# Patient Record
Sex: MALE | Race: WHITE | NOT HISPANIC OR LATINO | URBAN - METROPOLITAN AREA
[De-identification: names, ages, dates, MRNs, and addresses within clinical notes are randomized per-mention and may not be internally consistent; named-entity substitution may affect disease eponyms.]

---

## 2024-08-16 ENCOUNTER — TELEPHONE (OUTPATIENT)
Dept: PHYSICAL MEDICINE AND REHAB | Facility: CLINIC | Age: 64
End: 2024-08-16
Payer: OTHER GOVERNMENT

## 2024-08-16 NOTE — TELEPHONE ENCOUNTER
Message received that patient wanted to schedule with pain management and had referral. Called patient and he states he was referred by word of mouth (Dr. Hernandez) to see Dr. Guaman. At first, patient could not tell me an exact reason for PM eval. 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 He also endorsed that he has a hx of being on opioid pain meds, but not currently. He says that PCP put him on a medication, he did not specify, and he could not function while taking it. I advised patient that we are Interventional Pain Medicine and focus on interventional procedures to help our patients deal with chronic pain and do not generally prescribe opioid pain medications. I also told him that I really need a specific reason that he needs to be evaluated to put in my appt notes and to ensure that the appointment is appropriate. He verbalized that he has chronic back and knee pain. I told him that Dr. Guaman only goes to the [place] location and [place] location. Patient got upset and stated that he was told that Dr. Guaman goes to [place]. I explained that he did, but no longer goes there. I offered him first available at both [facility] [place] and [place] and patient became very upset at the wait time. I let him know that I put him on the wait list and that was in fact first available. He reluctantly accepted the appt and v/u.  RD